# Patient Record
(demographics unavailable — no encounter records)

---

## 2024-11-25 NOTE — DATA REVIEWED
[Imaging Present] : Present [de-identified] : CT Abdomen ([facility] 11/19/2024) IMPRESSION:      ACUTE SIGMOID DIVERTICULITIS. NO EVIDENCE OF PERFORATION OR ABSCESS. RECOMMEND FOLLOW-UP COLONOSCOPY AFTER TREATMENT TO EXCLUDE AN UNDERLYING LESION.   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   INDETERMINATE, DENSE SCLEROTIC LESIONS IN THE SKELETON. RECOMMEND FURTHER EVALUATION WITH NUCLEAR MEDICINE BONE SCAN.   IMPORTANT FINDING. THIS REPORT WILL BE FLAGGED (!) IN EPIC.   Bone scan 11/20with no report

## 2024-11-25 NOTE — HISTORY OF PRESENT ILLNESS
[FreeTextEntry1] : This is a 74-year-old female comes in after being sent by her medical doctor.  She had abdominal pain with known diverticulosis and had CT scan of her abdomen and pelvis.  She was put on antibiotics because of diverticulosis.  She also has an angiomyolipoma however she was found to have some enlarging/new neurotic bone lesions.  She had a previous CT scan in 2019.  And some are new and/or bigger than before.  They are overall not that big less than a centimeter however they need to the checked out and she was sent to me.  In addition the patient has full body pain including mid to lower back somewhat radiating to her buttocks as well as generalized bone and joint pain.  She is on exemestane for a long time.  She also has a history of being on Prolia for osteoporosis.  She has a history of breast cancer stage I in 2017 she had chemotherapy and radiation for and she is still on medicine for it now.  They have never found any lesions or problems now and are wondering whether this could be a) her source of pain or b) related to her tumor. [Stable] : stable [___ yrs] : [unfilled] year(s) ago [4] : currently ~his/her~ pain is 4 out of 10

## 2024-11-25 NOTE — DISCUSSION/SUMMARY
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  It is unclear when or where they could have come as they are somewhat new but all of them look very small.  It is does not look like osteopoikilosis but rather some variant thereof with some small bone lesions.  She supposed to get bone scan but has not had it yet.  I think a bone scan will let us know if these are active or not should they be active then a CT-guided biopsy may be helpful even though they would need to be drilled to be able to get tissue to get diagnosis.  I think this would only be to make sure that there is nothing metastatic and there is I do not think these are particularly aggressive.  If there hide on bone scan we would be obliged to take them otherwise if they are cold on bone scan I would leave them alone.  I can see her after get the studies done.  In addition we discussed seeing a cancer physiatrist because of her generalized pain which could be coming from exemestane.  We discussed that bisphosphonates or Prolia are not the cause of these lesions in general and similarly these lesions are unlikely the source of her pain as they are not affecting the structure of bone.  They also do not appear big enough or quickly moving enough to be causing significant tumor pain.  In any event I would have her follow-up after bone scan.  If imaging or pathology/biopsy was ordered, the patient was told to make an appointment to review findings right after all imaging is completed.  We discussed risks, benefits and alternatives. Rationale of care was reviewed and all questions were answered. Patient (and family) had all questions answered to her degree of the level of satisfaction. Patient (and family) expressed understanding and interest in proceeding with the plan as outlined.     This note was done with a voice recognition transcription software and any typos are related to this rather than medical error. Surgical risks reviewed. Patient (and family) had all questions answered to an agreeable level of satisfaction. Patient (and family) expressed understanding and interest in proceeding with the plan as outlined.

## 2024-11-25 NOTE — PHYSICAL EXAM
[FreeTextEntry1] : On exam the patient stands in good balance.  She has generalized pain but no localized tenderness.  Her back is able to move but she does complain of pain going down her legs.  She has some mild stiffness in all her joints but nothing that stopping her from moving.  Both hips have good range of motion symmetric.  She has no tenderness over her lumbar spine or over her SI joints or over her sacrum.  She is neurovascular intact distally with good strength and stability and sensation. [General Appearance - Well-Appearing] : Well appearing [General Appearance - Well Nourished] : well nourished [Oriented To Time, Place, And Person] : Oriented to person, place, and time [Sclera] : the sclera and conjunctiva were normal [Neck Cervical Mass (___cm)] : no neck mass was observed [Heart Rate And Rhythm] : heart rate was normal and rhythm regular [] : No respiratory distress [Abdomen Soft] : Soft [Normal Station and Gait] : gait and station were normal [Skin Changes - Describe changes:] : No skin changes noted [Normal] : Palpable DP and PT pulses, warm and pink with brisk capillary refill [Full ROM Unless otherwise noted:] : Full range of motion unless otherwise noted: [LE  Motor Strength Normal unless otherwise noted:] : 5/5 strength in bilateral lower extemities unless otherwise noted. [Tenderness] : no tenderness